# Patient Record
Sex: MALE | Race: WHITE | NOT HISPANIC OR LATINO | URBAN - METROPOLITAN AREA
[De-identification: names, ages, dates, MRNs, and addresses within clinical notes are randomized per-mention and may not be internally consistent; named-entity substitution may affect disease eponyms.]

---

## 2019-11-12 ENCOUNTER — OUTPATIENT (OUTPATIENT)
Dept: OUTPATIENT SERVICES | Facility: HOSPITAL | Age: 64
LOS: 1 days | End: 2019-11-12

## 2023-02-17 PROBLEM — Z00.00 ENCOUNTER FOR PREVENTIVE HEALTH EXAMINATION: Status: ACTIVE | Noted: 2023-02-17

## 2023-02-21 ENCOUNTER — APPOINTMENT (OUTPATIENT)
Dept: NEUROSURGERY | Facility: CLINIC | Age: 68
End: 2023-02-21
Payer: MEDICARE

## 2023-02-21 VITALS — BODY MASS INDEX: 34.07 KG/M2 | HEIGHT: 69 IN | WEIGHT: 230 LBS | TEMPERATURE: 98.7 F

## 2023-02-21 PROCEDURE — 99204 OFFICE O/P NEW MOD 45 MIN: CPT

## 2023-02-21 NOTE — PLAN
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 LUMBAR DECOMPRESSION WITH INSTRUMENTED STABILIZATION AND FUSION   L3-4\par \par This is a patient with degenerated lumbar disks with associated stenosis and spondylosis.  I have recommended nonsurgical management at this time.  This consists of physical therapy and/or manual medicine in conjunction to medical therapy and other conservative methods.  These include the consideration of trigger point injections and or the utilization of modalities such as TENS where applicable.  The next tier would be the referral to a pain management specialist (anesthesia or physiatry) for the consideration of spinal injections.  This includes the options of epidural steroids, facet injections as well as other novel techniques that may provide pain relief.  \par \par If all nonsurgical methods fail or there is neurological issue of concern, I have recommended lumbar decompression as a treatment option.  This entails removing the lamina and the medial facet joints along with the underlying hypertrophied ligamentum flavum.  This will allow for a widening of the spinal canal and the neuroforamen at the effected levels.  In doing so will likely result in added instability to the spine at this level requiring the need for instrumented stabilization and fusion.  This implies the use of pedicle screws and possibly interbody prosthetics to provide strength and anatomical alignment to the operated segments.  \par \par Risks and benefits of surgery were described to the patient in detail which include but not excluding those otherwise not mentioned:  coma paralysis, death, stroke, spinal fluid leak, nerve injury, weakness, infection, hardware malfunction/failure/mal-positioning requiring re-operation, vascular injury, nonunion/pseudoarthrosis, adjacent segment degeneration, dysphonia/dysphagia and persistent pain.\par \par I have reviewed the images in detail with the patient today in my office and have answered all questions regarding this condition to the best of my ability to the patient’s satisfaction.

## 2023-02-21 NOTE — RESULTS/DATA
[FreeTextEntry1] : \shay Millard MRI #[id number] reveals a grade 1 spondylolisthesis with lysis at L3-4 causing severe stenosis.

## 2023-02-21 NOTE — HISTORY OF PRESENT ILLNESS
[de-identified] : \shay Andrews is a pleasant 67-year-old gentleman with symptoms of neurogenic claudication and axial back pain.  He has had symptoms for many years with worsening in the recent past.  He is done all treatment modalities including physical therapy and pain management injections.  At this point he is having difficulty walking any distances and his pain is increased.  He has an MRI of the lumbar spine for my review done at [facility].\shay

## 2023-02-21 NOTE — PHYSICAL EXAM
[Normal] : normal [Antalgic] : antalgic [Able to toe walk] : the patient was able to toe walk [Able to heel walk] : the patient was able to heel walk [Intact] : all sensory within normal limits bilaterally

## 2023-03-15 ENCOUNTER — APPOINTMENT (OUTPATIENT)
Dept: NEUROSURGERY | Facility: HOSPITAL | Age: 68
End: 2023-03-15

## 2023-03-16 RX ORDER — CYCLOBENZAPRINE HYDROCHLORIDE 7.5 MG/1
7.5 TABLET, FILM COATED ORAL 3 TIMES DAILY
Qty: 60 | Refills: 0 | Status: ACTIVE | COMMUNITY
Start: 2023-03-16 | End: 1900-01-01

## 2023-03-22 ENCOUNTER — APPOINTMENT (OUTPATIENT)
Dept: NEUROSURGERY | Facility: CLINIC | Age: 68
End: 2023-03-22
Payer: MEDICARE

## 2023-03-22 VITALS
HEART RATE: 49 BPM | WEIGHT: 230 LBS | BODY MASS INDEX: 34.07 KG/M2 | OXYGEN SATURATION: 97 % | HEIGHT: 69 IN | SYSTOLIC BLOOD PRESSURE: 166 MMHG | DIASTOLIC BLOOD PRESSURE: 80 MMHG

## 2023-03-22 PROCEDURE — 99024 POSTOP FOLLOW-UP VISIT: CPT

## 2023-03-22 RX ORDER — NEBIVOLOL HYDROCHLORIDE 20 MG/1
20 TABLET ORAL
Refills: 0 | Status: ACTIVE | COMMUNITY

## 2023-03-22 RX ORDER — IRBESARTAN 150 MG/1
150 TABLET ORAL
Refills: 0 | Status: ACTIVE | COMMUNITY

## 2023-03-22 RX ORDER — ATORVASTATIN CALCIUM 10 MG/1
10 TABLET, FILM COATED ORAL
Refills: 0 | Status: ACTIVE | COMMUNITY

## 2023-03-22 RX ORDER — METFORMIN HYDROCHLORIDE 1000 MG/1
1000 TABLET, FILM COATED, EXTENDED RELEASE ORAL
Refills: 0 | Status: ACTIVE | COMMUNITY

## 2023-03-22 RX ORDER — OMEPRAZOLE 40 MG/1
40 CAPSULE, DELAYED RELEASE ORAL
Refills: 0 | Status: ACTIVE | COMMUNITY

## 2023-03-22 RX ORDER — OXYCODONE AND ACETAMINOPHEN 5; 325 MG/1; MG/1
5-325 TABLET ORAL
Qty: 30 | Refills: 0 | Status: ACTIVE | COMMUNITY
Start: 2023-03-22 | End: 1900-01-01

## 2023-03-22 RX ORDER — HYDROCHLOROTHIAZIDE 25 MG/1
25 TABLET ORAL
Refills: 0 | Status: ACTIVE | COMMUNITY

## 2023-03-22 RX ORDER — ATORVASTATIN CALCIUM 80 MG/1
TABLET, FILM COATED ORAL
Refills: 0 | Status: ACTIVE | COMMUNITY

## 2023-03-22 NOTE — HISTORY OF PRESENT ILLNESS
[FreeTextEntry1] : patient presents today for follow up after l3-4 decompression and fusion___.  patient reports improving pre-operative symptoms\par no new nt, weakness, balance issues, or bladder issues\par pain well controlled\par

## 2023-03-22 NOTE — REASON FOR VISIT
[de-identified] : Decompression laminectomy,L3-L4; peddicle fixation of L3-L4. [de-identified] : 03/15/2023 [de-identified] : 1 [de-identified] : Pt is here for his 1 week post op visit.

## 2023-04-04 ENCOUNTER — APPOINTMENT (OUTPATIENT)
Dept: NEUROSURGERY | Facility: CLINIC | Age: 68
End: 2023-04-04
Payer: MEDICARE

## 2023-04-04 PROCEDURE — 99024 POSTOP FOLLOW-UP VISIT: CPT

## 2023-04-04 NOTE — REASON FOR VISIT
[de-identified] : 03/15/2023 [de-identified] : 3 [de-identified] : Pt is here 3 week post op, wound check.

## 2023-04-04 NOTE — ASSESSMENT
[FreeTextEntry1] : patient comes into today for wound check with concerns\par wound well healing without signs of infection or concern\par \par continue follow up as planned

## 2023-04-17 ENCOUNTER — APPOINTMENT (OUTPATIENT)
Dept: NEUROSURGERY | Facility: CLINIC | Age: 68
End: 2023-04-17
Payer: MEDICARE

## 2023-04-17 PROCEDURE — 99024 POSTOP FOLLOW-UP VISIT: CPT

## 2023-04-17 NOTE — REASON FOR VISIT
[de-identified] : Decompressive laminectomy,L3-4 [de-identified] : 03/15/2023 [de-identified] : 4 [de-identified] : Pt is here for his 4 week post op visit.

## 2023-04-17 NOTE — ASSESSMENT
[FreeTextEntry1] : Status post L3-L4 decompression and fusion with excellent resolution of presurgical symptoms on March 15, 2023.  Discussed activity limitations we will see the patient back follow-up in 3 months for repeat x-rays patient understands agrees with plan all questions were answered his wife was present for the entire visit

## 2023-04-17 NOTE — HISTORY OF PRESENT ILLNESS
[FreeTextEntry1] : 67-year-old male presents today for follow-up after L3-4 decompression fusion on 3/15/2023.  He is improving presurgical symptoms.  Denies any new neurological symptoms.  No new issues to address today

## 2023-07-17 ENCOUNTER — APPOINTMENT (OUTPATIENT)
Dept: NEUROSURGERY | Facility: CLINIC | Age: 68
End: 2023-07-17
Payer: MEDICARE

## 2023-07-17 VITALS — DIASTOLIC BLOOD PRESSURE: 81 MMHG | HEART RATE: 47 BPM | SYSTOLIC BLOOD PRESSURE: 151 MMHG | HEIGHT: 69 IN

## 2023-07-17 DIAGNOSIS — M43.10 SPONDYLOLISTHESIS, SITE UNSPECIFIED: ICD-10-CM

## 2023-07-17 PROCEDURE — 99213 OFFICE O/P EST LOW 20 MIN: CPT

## 2023-07-17 RX ORDER — BLOOD SUGAR DIAGNOSTIC
STRIP MISCELLANEOUS
Qty: 100 | Refills: 0 | Status: ACTIVE | COMMUNITY
Start: 2023-07-10

## 2023-07-17 RX ORDER — SODIUM SULFATE, POTASSIUM SULFATE AND MAGNESIUM SULFATE 1.6; 3.13; 17.5 G/177ML; G/177ML; G/177ML
17.5-3.13-1.6 SOLUTION ORAL
Qty: 354 | Refills: 0 | Status: ACTIVE | COMMUNITY
Start: 2023-04-27

## 2023-07-17 RX ORDER — BLOOD-GLUCOSE METER
W/DEVICE EACH MISCELLANEOUS
Qty: 1 | Refills: 0 | Status: ACTIVE | COMMUNITY
Start: 2023-03-29

## 2023-07-17 RX ORDER — METHOCARBAMOL 750 MG/1
750 TABLET, FILM COATED ORAL
Qty: 90 | Refills: 0 | Status: ACTIVE | COMMUNITY
Start: 2023-03-16

## 2023-07-17 RX ORDER — METFORMIN ER 500 MG 500 MG/1
500 TABLET ORAL
Qty: 180 | Refills: 0 | Status: ACTIVE | COMMUNITY
Start: 2023-06-03

## 2023-07-17 RX ORDER — IRBESARTAN 300 MG/1
300 TABLET ORAL
Qty: 90 | Refills: 0 | Status: ACTIVE | COMMUNITY
Start: 2023-04-24

## 2023-07-17 NOTE — HISTORY OF PRESENT ILLNESS
[FreeTextEntry1] : 67-year-old male presents today for follow-up after L3-4 decompression fusion on 3/15/2023.  He is improving presurgical symptoms.  Denies any new neurological symptoms.  No new issues to address today\par \par presents with wife today\par patient reports being happy with the results of the surgery

## 2023-07-17 NOTE — REASON FOR VISIT
[Follow-Up: _____] : a [unfilled] follow-up visit [FreeTextEntry1] : Pt 66 y/o M presents in office today for 4 month F/U veena, no other complaints [de-identified] : Decompressive laminectomy,L3-4 [de-identified] : 03/15/2023 [de-identified] : 4 [de-identified] : Pt is here for his 4 week post op visit.

## 2023-07-17 NOTE — ASSESSMENT
[FreeTextEntry1] : Status post L3-L4 decompression and fusion with excellent resolution of presurgical symptoms on March 15, 2023.  Discussed activity limitations we will see the patient back follow-up in 6 months for repeat x-rays patient understands agrees with plan all questions were answered his wife was present for the entire visit

## 2024-01-16 ENCOUNTER — APPOINTMENT (OUTPATIENT)
Dept: NEUROSURGERY | Facility: CLINIC | Age: 69
End: 2024-01-16
Payer: MEDICARE

## 2024-01-16 DIAGNOSIS — M48.062 SPINAL STENOSIS, LUMBAR REGION WITH NEUROGENIC CLAUDICATION: ICD-10-CM

## 2024-01-16 PROCEDURE — 72100 X-RAY EXAM L-S SPINE 2/3 VWS: CPT

## 2024-01-16 PROCEDURE — 99213 OFFICE O/P EST LOW 20 MIN: CPT

## 2024-01-16 NOTE — HISTORY OF PRESENT ILLNESS
[FreeTextEntry1] : 67-year-old male presents today for follow-up after L3-4 decompression fusion on 3/15/2023.  He is improving presurgical symptoms.  Denies any new neurological symptoms.  No new issues to address today   patient reports being happy with the results of the surgery

## 2024-01-16 NOTE — RESULTS/DATA
[FreeTextEntry1] :  xrays of the lumbar spine ap lateral views done in the office today limited to spinal interpretation only show well placed fusion with some signs of fusion

## 2024-01-16 NOTE — REASON FOR VISIT
[Follow-Up: _____] : a [unfilled] follow-up visit [FreeTextEntry1] : Pt 66 y/o M presents in office today for 4 month F/U veena, no other complaints [de-identified] : Decompressive laminectomy,L3-4 [de-identified] : 03/15/2023 [de-identified] : 4 [de-identified] : Pt is here for his 4 week post op visit.

## 2024-06-17 ENCOUNTER — APPOINTMENT (OUTPATIENT)
Dept: NEUROSURGERY | Facility: CLINIC | Age: 69
End: 2024-06-17
Payer: MEDICARE

## 2024-06-17 VITALS
BODY MASS INDEX: 34.07 KG/M2 | HEIGHT: 69 IN | SYSTOLIC BLOOD PRESSURE: 132 MMHG | WEIGHT: 230 LBS | HEART RATE: 62 BPM | DIASTOLIC BLOOD PRESSURE: 78 MMHG

## 2024-06-17 DIAGNOSIS — Z98.1 ARTHRODESIS STATUS: ICD-10-CM

## 2024-06-17 PROCEDURE — 99212 OFFICE O/P EST SF 10 MIN: CPT

## 2024-06-17 PROCEDURE — 72100 X-RAY EXAM L-S SPINE 2/3 VWS: CPT

## 2024-06-17 NOTE — RESULTS/DATA
[FreeTextEntry1] :  xrays of the lumbar spine ap lateral views done in the office today limited to spinal interpretation only show well placed fusion with some signs of fusion reviewed with neurosurgical attending

## 2024-06-17 NOTE — REASON FOR VISIT
[Follow-Up: _____] : a [unfilled] follow-up visit [FreeTextEntry1] : Pt 66 y/o M presents in office today for 4 month F/U veena, no other complaints [de-identified] : Decompressive laminectomy,L3-4 [de-identified] : 03/15/2023 [de-identified] : 4 [de-identified] : Pt is here for his 4 week post op visit.